# Patient Record
Sex: MALE | Race: WHITE
[De-identification: names, ages, dates, MRNs, and addresses within clinical notes are randomized per-mention and may not be internally consistent; named-entity substitution may affect disease eponyms.]

---

## 2019-01-25 NOTE — HISTORY AND PHYSICAL
DATE OF SERVICE:  



COLONOSCOPY SUMMARY



HISTORY OF PRESENT ILLNESS:

The patient is an 87-year-old white male who reports about a 6-month history of

constipation.  He tried over-the-counter Dulcolax for one week, did not take it

longer because of the package insert instructions advising against prolonged

use, but did not notice much benefit he is going about every week and straining

to pass the hard stool.  He denies any associated blood.  He has had some

associated fatigue.  It has been a little over 8 years since his last

colonoscopy, at which time he had one small adenoma removed.  He has had no

chills, fever or associated abdominal pain.  He has been a little more cold

intolerant.  He is not aware of any family history for a thyroid abnormality and

his appetite has been normal and he believes that his weight has been stable.

There have been no medication changes.



MEDICATIONS:

Include amitriptyline 25 mg at bedtime that has been longstanding, finasteride 5

mg daily and tamsulosin 4 mg daily, longstanding.  There have been no new

supplements and he is not on any form of calcium.



He came in with a letter from his daughter who stated that she was diagnosed

with a small fiber neuropathy of which orthostatic hypotension, which the

patient has had some issues within the past, could be a symptom.  She had

resolution of her symptoms with B6 replacement and was asking for a B6 level.



PHYSICAL EXAMINATION:

GENERAL:  Reveals a white male who did not appear to be in acute stress.

HEENT:  Unremarkable.  Sclerae nonicteric.  No pallor noted.

NECK:  Revealed no thyroid abnormalities to palpation, JVD or bruits.

CHEST:  Clear to auscultation.

CARDIOVASCULAR:  Reveals regular rate and rhythm without murmur, S3 or S4.

ABDOMEN:  Soft, supple without mass, organomegaly or tenderness.

EXTREMITIES:  Reveal no cyanosis, clubbing or edema.



ASSESSMENT AND PLAN:

1.  Recent onset of constipation with a past history of adenomatous colonic

polyps.  The patient was set up for a diagnostic colonoscopy on 01/25.  Prep

instructions with split dose Colyte were given and questions were answered.

2.  Family history of small fiber neuropathy.  Discussed without symptoms and

only occasional orthostatic hypotension in the past that It was unlikely, but  
                                           supplementation was reasonable and 
would just advocate that

as opposed to checking a level.  We will see him back for a followup in 6

months.





Job ID: [id number]

DocumentID: [id number]

Dictated Date:  01/22/2019 16:58:27

Transcription Date: 01/22/2019 18:03:12

Dictated By: EUNICE BAPTISTE MD

MTDD

## 2019-01-29 ENCOUNTER — HOSPITAL ENCOUNTER (OUTPATIENT)
Dept: HOSPITAL 75 - PREOP | Age: 84
Discharge: HOME | End: 2019-01-29
Attending: INTERNAL MEDICINE
Payer: MEDICARE

## 2019-01-29 VITALS — BODY MASS INDEX: 20.3 KG/M2 | WEIGHT: 145 LBS | HEIGHT: 71 IN

## 2019-01-29 DIAGNOSIS — Z01.818: Primary | ICD-10-CM

## 2019-02-01 ENCOUNTER — HOSPITAL ENCOUNTER (OUTPATIENT)
Dept: HOSPITAL 75 - ENDO | Age: 84
Discharge: HOME | End: 2019-02-01
Attending: INTERNAL MEDICINE
Payer: MEDICARE

## 2019-02-01 VITALS — SYSTOLIC BLOOD PRESSURE: 140 MMHG | DIASTOLIC BLOOD PRESSURE: 71 MMHG

## 2019-02-01 VITALS — DIASTOLIC BLOOD PRESSURE: 71 MMHG | SYSTOLIC BLOOD PRESSURE: 140 MMHG

## 2019-02-01 VITALS — WEIGHT: 145 LBS | BODY MASS INDEX: 20.3 KG/M2 | HEIGHT: 71 IN

## 2019-02-01 VITALS — DIASTOLIC BLOOD PRESSURE: 65 MMHG | SYSTOLIC BLOOD PRESSURE: 148 MMHG

## 2019-02-01 VITALS — SYSTOLIC BLOOD PRESSURE: 153 MMHG | DIASTOLIC BLOOD PRESSURE: 81 MMHG

## 2019-02-01 DIAGNOSIS — D12.0: Primary | ICD-10-CM

## 2019-02-01 DIAGNOSIS — N40.0: ICD-10-CM

## 2019-02-01 DIAGNOSIS — K59.00: ICD-10-CM

## 2019-02-01 DIAGNOSIS — K63.5: ICD-10-CM

## 2019-02-01 DIAGNOSIS — Z79.899: ICD-10-CM

## 2019-02-01 DIAGNOSIS — R53.83: ICD-10-CM

## 2019-02-01 DIAGNOSIS — K57.30: ICD-10-CM

## 2019-02-01 NOTE — OPERATIVE REPORT
DATE OF SERVICE:  



COLONOSCOPY SUMMARY



INDICATION FOR THE PROCEDURE:

The patient was placed in the left lateral decubitus position.  Prior to doing

colonoscopy, digital rectal evaluation was performed.  Anal sphincter tone was

normal.  The prostate was mildly enlarged, anodular and nontender to digital

inspection.  Anal sphincter tone was lax, but the patient was sedated.  The

perianal reflex was intact.  No other abnormalities noted on digital inspection

of anal canal or distal rectal vault.  The colonoscope was then inserted into

the rectum and under direct visualization advanced to the cecum.  The cecum was

identified by identification of the ileocecal valve, cecal strap and the

appendiceal orifice.  Careful inspection was made as the colonoscope was

withdrawn.



FINDINGS:

There was no evidence for internal or external hemorrhoids and the rectum was

unremarkable.  Present at 30 cm in the proximal sigmoid colon was a 3 mm sessile

inflammatory appearing polyp.  It was biopsied, very friable with detachment of

essentially all of the visible polyp tissue.  There was some oozing from the

base, which was cauterized with cessation of oozing. The polyp was submitted 
for                                          histopathology.  Several small 
proximal sigmoid diverticulum were present without                             
          evidence for diverticulitis.  The descending colon, splenic flexure, 
transverse colon,                                    hepatic flexure and 
ascending colon were unremarkable.  There was a large flat polyp               
                                    either spilling out of or into the 
appendiceal orifice.  It was somewhat friable and                              
              multiple biopsies were obtained without significant amount of 
induration being noted on

biopsy and it was too large to cauterize and again was involving the appendiceal

orifice.  There was probably at least 2 cm of normal appearing mucosa between it

and the ileocecal valve.  No cecal deformity was noted.



ASSESSMENT:

1.  A large, sessile, lobulated, villous appearing adenoma was noted in the 
cecum spilling out of or

into the appendiceal orifice; it was somewhat friable, but not overtly firm to

biopsy.  There appeared to be at least a 2 cm margin between the closest polyp

segment and the ileocecal valve.  We will need to await histopathology report

before making likely surgical recommendation.

2.  An inflammatory appearing polyp was removed via hot forceps from the

proximal sigmoid colon at 30 cm.

3.  Mild diverticular disease noted in the proximal sigmoid colon was present

without evidence for diverticulitis.

4.  Prostate examination was compatible with mild BPH with no nodularity  noted 
on JEMIMA.





Job ID: [id number]

DocumentID: [id number]

Dictated Date:  02/01/2019 10:40:14

Transcription Date: 02/01/2019 15:06:47

Dictated By: EUNICE BAPTISTE MD

MTDD

## 2019-02-01 NOTE — PRE-OP NOTE & CONSCIOUS SEDAT
Pre-Operative Progress Note


H&P Reviewed


The H&P was reviewed, patient examined and no changes noted.


Date H&P Reviewed:  Feb 1, 2019


Time H&P Reviewed:  08:55





Conscious Sedation Pre-Proced


ASA Score


2


For ASA 3 and 4: Consider anesthesia and medical clearance. Also, for 

patients with a history of failed moderate sedation consider anesthesia.

















Airway 


 


Lungs 


 


Heart 


 


 ASA score


 


 ASA 1: a normal healthy patient


 


 ASA 2:  a patient with a mild systemic disease (mid diabetes, controlled 

hypertension, obesity 


 


 ASA 3:  a patient with a severe systemic disease that limits activity  (angina

, COPD, prior Myocardial infarction)


 


 ASA 4:  a patient with an incapacitating disease that is a constant threat to 

life (CHF, renal failure)


 


 ASA 5:  a moribund patient not expected to survive 24 hrs.  (ruptured aneurysm)


 


 ASA 6:  a declared brain-dead patient whose organs are being harvested.


 


 For emergent operations, add the letter E after the classification











Mallampati Classification


Grade 1





Sedation Plan


Analgesia, Amnesia, Plan communicated to team members, Discussed options with 

patient/fam, Discussed risks with patient/fam


The patient is an appropriate candidate to undergo the planned procedure, 

sedation, and anesthesia.





The patient immediately re-assessed prior to indication.











EUNICE BAPTISTE MD Feb 1, 2019 09:12

## 2020-07-28 ENCOUNTER — HOSPITAL ENCOUNTER (EMERGENCY)
Dept: HOSPITAL 75 - ER | Age: 85
Discharge: HOME | End: 2020-07-28
Payer: MEDICARE

## 2020-07-28 VITALS — HEIGHT: 69.69 IN | WEIGHT: 130.07 LBS | BODY MASS INDEX: 18.83 KG/M2

## 2020-07-28 VITALS — DIASTOLIC BLOOD PRESSURE: 87 MMHG | SYSTOLIC BLOOD PRESSURE: 128 MMHG

## 2020-07-28 DIAGNOSIS — Z00.00: Primary | ICD-10-CM

## 2020-07-28 DIAGNOSIS — Z87.891: ICD-10-CM

## 2020-07-28 PROCEDURE — 99283 EMERGENCY DEPT VISIT LOW MDM: CPT

## 2020-07-28 NOTE — XMS REPORT
Continuity of Care Document

                             Created on: 2020



FEI TRAN

External Reference #: [id number]

: 1931

Sex: Male



Demographics





                          Address                   [address] [place]  [address]

 

                          Home Phone                [phone] x

 

                          Preferred Language        Unknown

 

                          Marital Status            Unknown

 

                          Buddhist Affiliation     Unknown

 

                          Race                      Unknown

 

                          Ethnic Group              Unknown





Author





                          Organization              Unknown

 

                          Address                   Unknown

 

                          Phone                     Unavailable



              



Allergies

      



             Active           Description           Code           Type         

  Severity   

                Reaction           Onset           Reported/Identified          

 

Relationship to Patient                 Clinical Status        

 

                Yes             No Known Drug Allergies           [id number]    

       Drug 

Allergy           Unknown           N/A                             2019  

      

                                                             



                  



Medications

      



There is no data.                  



Problems

      



             Date Dx Coded           Attending           Type           Code    

       

Diagnosis                               Diagnosed By        

 

                2019           EUNICE BAPTISTE MD           Ot            

  Z01.818          

                          ENCOUNTER FOR OTHER PREPROCEDURAL EXAMIN              

      

 

                2019           EUNICE BAPTISTE MD, Ot            

  Z01.818          

                          ENCOUNTER FOR OTHER PREPROCEDURAL EXAMIN              

      

 

                2019           EUNICE BAPTISTE MD, Ot            

  Z01.818          

                          ENCOUNTER FOR OTHER PREPROCEDURAL EXAMIN              

      

 

             2019           EUNICE BAPTISTE MD           Ot           D12.

0           

BENIGN NEOPLASM OF CECUM                         

 

             2019           EUNICE BAPTISTE MD           Ot           K57.

30           

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                    

 

             2019           EUNICE BAPTISTE MD           Ot           K59.

00           

CONSTIPATION, UNSPECIFIED                        

 

             2019           EUNICE BAPTISTE MD           Ot           K63.

5           

POLYP OF COLON                                   

 

             2019           EUNICE BAPTISTE MD           Ot           N40.

0           

BENIGN PROSTATIC HYPERPLASIA WITHOUT LOW                    

 

             2019           EUNICE BAPTISTE MD           Ot           R53.

83           

OTHER FATIGUE                                    

 

                2019           EUNICE BAPTISTE MD           Ot            

  Z79.899          

                          OTHER LONG TERM (CURRENT) DRUG THERAPY                

    

 

             2019           EUNICE BAPTISTE MD           Ot           D12.

0           

BENIGN NEOPLASM OF CECUM                         

 

             2019           EUNICE BAPTISTE MD           Ot           K57.

30           

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                    

 

             2019           EUNICE BAPTISTE MD           Ot           K59.

00           

CONSTIPATION, UNSPECIFIED                        

 

             2019           EUNICE BAPTISTE MD           Ot           K63.

5           

POLYP OF COLON                                   

 

             2019           EUNICE BAPTISTE MD           Ot           N40.

0           

BENIGN PROSTATIC HYPERPLASIA WITHOUT LOW                    

 

             2019           EUNICE BAPTISTE MD           Ot           R53.

83           

OTHER FATIGUE                                    

 

                2019           EUNICE BAPTISTE MD           Ot            

  Z79.899          

                          OTHER LONG TERM (CURRENT) DRUG THERAPY                

    



                                                  



Procedures

      



There is no data.                  



Results

      



There is no data.              



Encounters

      



                ACCT No.           Visit Date/Time           Discharge          

 Status         

             Pt. Type           Provider           Facility           Loc./Unit 

          

Complaint        

 

                    [id number]           2019 08:11:00           

019 11:10:00        

                DIS             Outpatient           EUNICE BAPTISTE MD         

  [facility]           ENDO                      CONSTIPATION/FATIQUE   

     

 

                    [id number]           2019 10:30:00            13:21:00        

                DIS             Outpatient           EUNICE BAPTISTE MD         

  [facility]           PREOP                     COLONOSCOPY

## 2020-07-28 NOTE — ED GENERAL
General


Chief Complaint:  Bite-Animal/Human/Insect


Stated Complaint:  ANIMAL BITE


Source of Information:  Patient


Exam Limitations:  No Limitations





History of Present Illness


Date Seen by Provider:  Jul 28, 2020


Time Seen by Provider:  12:13





Allergies and Home Medications


Allergies


Coded Allergies:  


     No Known Drug Allergies (Unverified , 1/29/19)





Home Medications


Amitriptyline HCl 25 Mg Tablet, 25 MG PO HS, (Reported)


Finasteride 5 Mg Tablet, 5 MG PO DAILY, (Reported)


Multivitamin 1 Each Tablet, 1 EACH PO DAILY, (Reported)


Tamsulosin HCl 0.4 Mg Cap.er.24h, 0.4 MG PO DAILY, (Reported)


   Daily until stone passage 





Past Medical-Social-Family Hx


Patient Social History


Alcohol Use:  Denies Use


Recreational Drug Use:  No


Smoking Status:  Former Smoker


Former Smoker, Quit:  Jan 29, 1958


2nd Hand Smoke Exposure:  No


Recent Hopitalizations:  No


Physical Abuse:  No


Sexual Abuse:  No


Mistreated:  No


Fear:  No





Immunizations Up To Date


Date of Pneumonia Vaccine:  Oct 8, 2018


Date of Influenza Vaccine:  Oct 8, 2018





Seasonal Allergies


Seasonal Allergies:  No





Past Medical History


Surgeries:  Yes (HERNIA REPAIR)


Respiratory:  No


Cardiac:  Yes


Irregular Heartbeat


Neurological:  No


Sexually Transmitted Disease:  No


HIV/AIDS:  No


Genitourinary:  Yes


Prostate Problems


Musculoskeletal:  No


Endocrine:  No


HEENT:  Yes (GLASSES)


Loss of Vision:  Bilateral


Hearing Impairment:  Hard of Hearing, Bilateral Hearing Aide


Cancer:  No


Psychosocial:  Yes


Sleep Difficulties


Integumentary:  No


Blood Disorders:  No


Adverse Reaction/Blood Tranf:  No (N/A)





Physical Exam


Vital Signs


Capillary Refill :


Height, Weight, BMI


Height: 5'11.00"


Weight: 145lbs. 0.0oz. 65.866357ay; 20.2 BMI


Method:





Progress/Results/Core Measures


Suspected Sepsis


SIRS


Temperature: 


Pulse:  


Respiratory Rate: 


 


Blood Pressure  / 


Mean:





Results/Orders


Vital Signs/I&O


Capillary Refill :





Departure


Impression





   Primary Impression:  


   General medical exam


Disposition:  01 HOME, SELF-CARE


Condition:  Stable/Unchanged





Departure-Patient Inst.


Decision time for Depature:  12:13


Referrals:  


EUNICE BAPTISTE MD (PCP/Family)


Primary Care Physician


Patient Instructions:  Animal Bites (DC)





Add. Discharge Instructions:  


Watch for any signs of infection such as increased redness, swelling, drainage, 

pain, warmth to the concerning area. Follow-up with Dr. Baptiste's office within 

1 week for a recheck, call today for an appointment time. Return back to the 

emergency room for any worsening symptoms or concerns as needed.





All discharge instructions reviewed with patient and/or family. Voiced 

understanding.











DHEERAJ JOHNSON                  Jul 28, 2020 12:15